# Patient Record
Sex: MALE | Race: WHITE | URBAN - METROPOLITAN AREA
[De-identification: names, ages, dates, MRNs, and addresses within clinical notes are randomized per-mention and may not be internally consistent; named-entity substitution may affect disease eponyms.]

---

## 2022-07-27 ENCOUNTER — OFFICE VISIT (OUTPATIENT)
Dept: URBAN - METROPOLITAN AREA CLINIC 111 | Facility: CLINIC | Age: 62
End: 2022-07-27
Payer: COMMERCIAL

## 2022-07-27 ENCOUNTER — DASHBOARD ENCOUNTERS (OUTPATIENT)
Age: 62
End: 2022-07-27

## 2022-07-27 ENCOUNTER — WEB ENCOUNTER (OUTPATIENT)
Dept: URBAN - METROPOLITAN AREA CLINIC 111 | Facility: CLINIC | Age: 62
End: 2022-07-27

## 2022-07-27 VITALS
HEIGHT: 72 IN | WEIGHT: 188.2 LBS | SYSTOLIC BLOOD PRESSURE: 133 MMHG | HEART RATE: 82 BPM | TEMPERATURE: 97.3 F | BODY MASS INDEX: 25.49 KG/M2 | DIASTOLIC BLOOD PRESSURE: 85 MMHG

## 2022-07-27 DIAGNOSIS — Z12.11 COLON CANCER SCREENING: ICD-10-CM

## 2022-07-27 DIAGNOSIS — K76.9 LIVER LESION, RIGHT LOBE: ICD-10-CM

## 2022-07-27 DIAGNOSIS — N20.0 NEPHROLITHIASIS: ICD-10-CM

## 2022-07-27 DIAGNOSIS — R10.9 ACUTE RIGHT FLANK PAIN: ICD-10-CM

## 2022-07-27 PROBLEM — 95570007: Status: ACTIVE | Noted: 2022-07-27

## 2022-07-27 PROBLEM — 235856003 LIVER DISEASE: Status: ACTIVE | Noted: 2022-07-27

## 2022-07-27 PROCEDURE — 99204 OFFICE O/P NEW MOD 45 MIN: CPT | Performed by: INTERNAL MEDICINE

## 2022-07-27 RX ORDER — LIDOCAINE 140 MG/1
1 PATCH REMOVE AFTER 12 HOURS PATCH CUTANEOUS ONCE A DAY
Status: ACTIVE | COMMUNITY

## 2022-07-27 RX ORDER — CYCLOBENZAPRINE HYDROCHLORIDE 10 MG/1
1 TABLET AT BEDTIME AS NEEDED TABLET, FILM COATED ORAL ONCE A DAY
Status: ACTIVE | COMMUNITY

## 2022-07-27 NOTE — HPI-TODAY'S VISIT:
- 62 yo  male, self-referred for further evaluation of GI complaints as detailed below - Patient was seen in the emergency room at [facility] 2 days ago with complaints of acute right-sided flank pain.  Had normal blood work.  Noncontrast abdominopelvic CT was done, which revealed nonobstructing left-sided nephrolithiasis.  The stones were tiny, measuring up to 4 mm in size with no hydronephrosis.  More importantly, the noncontrast CT suggested an ill-defined hypoattenuating right hepatic lobe lesion within segment 5, which measured 4.8 cm in size.  It was incompletely evaluated due to the noncontrast nature of the CT.   - Patient was discharged home on a lidocaine patch as well as as needed diclofenac and cyclobenzaprine.  Tells me he is feeling much better at this time.  He is obviously concerned about his possible liver lesion.  Denies history of chronic liver disease.  States he had acute viral hepatitis in 1996, with subsequent seroconversion.  Liver function tests were normal at [facility] 2 days ago. - Patient has never had a colonoscopy in the past - Denies hematochezia, change in bowel habits, or weight loss - Does not know his family medical history, as he was adopted  - Denies heartburn symptoms or history of chronic GERD - Medical records from [facility] were reviewed and discussed with patient

## 2022-07-28 ENCOUNTER — LAB OUTSIDE AN ENCOUNTER (OUTPATIENT)
Dept: URBAN - METROPOLITAN AREA CLINIC 23 | Facility: CLINIC | Age: 62
End: 2022-07-28

## 2022-07-28 LAB
CREATININE POC: 1.1
PERFORMING LAB: (no result)

## 2022-07-29 ENCOUNTER — WEB ENCOUNTER (OUTPATIENT)
Dept: URBAN - METROPOLITAN AREA CLINIC 23 | Facility: CLINIC | Age: 62
End: 2022-07-29

## 2022-08-01 ENCOUNTER — WEB ENCOUNTER (OUTPATIENT)
Dept: URBAN - METROPOLITAN AREA CLINIC 111 | Facility: CLINIC | Age: 62
End: 2022-08-01

## 2022-08-01 ENCOUNTER — TELEPHONE ENCOUNTER (OUTPATIENT)
Dept: URBAN - METROPOLITAN AREA CLINIC 23 | Facility: CLINIC | Age: 62
End: 2022-08-01

## 2022-08-01 RX ORDER — CYCLOBENZAPRINE HYDROCHLORIDE 10 MG/1: 1 TABLET AT BEDTIME AS NEEDED TABLET, FILM COATED ORAL ONCE A DAY

## 2022-08-18 ENCOUNTER — LAB OUTSIDE AN ENCOUNTER (OUTPATIENT)
Dept: URBAN - METROPOLITAN AREA CLINIC 23 | Facility: CLINIC | Age: 62
End: 2022-08-18

## 2022-08-18 LAB
ABSOLUTE BASOPHIL COUNT: 0.04
ABSOLUTE EOSINOPHIL COUNT: 0.04
ABSOLUTE IMMATURE GRANULOCYTE  COUNT: 0.11
ABSOLUTE LYMPHOCYTE COUNT: 0.99
ABSOLUTE MONOCYTE COUNT: 0.89
ABSOLUTE NEUTROPHIL COUNT (ANC): 6.32
ABSOLUTE NRBC  COUNT: 0
BASOPHIL AUTO: 0
EOS AUTO: 0
HCT: 43.3
HGB: 13.8
IMMATURE GRANULOCYTES AUTO: 1.3
INR: 1
LYMPH AUTO: 12
MCH: 27.9
MCHC: 31.9
MCV: 87.7
MONO AUTO: 11
MPV: 9.4
NEUTRO AUTO: 75
NRBC AUTO: 0
PARTIAL THROMBOPLASTIN TIME: 32.7
PERFORMING LAB: (no result)
PLATELETS: 263
PT: 13.3
RBC: 4.94
RDW: 11.9
WBC: 8.4

## 2022-08-31 ENCOUNTER — TELEPHONE ENCOUNTER (OUTPATIENT)
Dept: URBAN - METROPOLITAN AREA CLINIC 92 | Facility: CLINIC | Age: 62
End: 2022-08-31

## 2022-08-31 ENCOUNTER — LAB OUTSIDE AN ENCOUNTER (OUTPATIENT)
Dept: URBAN - METROPOLITAN AREA CLINIC 92 | Facility: CLINIC | Age: 62
End: 2022-08-31

## 2022-09-02 ENCOUNTER — OFFICE VISIT (OUTPATIENT)
Dept: URBAN - METROPOLITAN AREA SURGERY CENTER 15 | Facility: SURGERY CENTER | Age: 62
End: 2022-09-02
Payer: COMMERCIAL

## 2022-09-02 ENCOUNTER — TELEPHONE ENCOUNTER (OUTPATIENT)
Dept: URBAN - METROPOLITAN AREA CLINIC 92 | Facility: CLINIC | Age: 62
End: 2022-09-02

## 2022-09-02 DIAGNOSIS — C22.9 LIVER CANCER: ICD-10-CM

## 2022-09-02 PROBLEM — 307226002: Status: ACTIVE | Noted: 2022-08-31

## 2022-09-02 PROBLEM — 40719004 EROSIVE ESOPHAGITIS: Status: ACTIVE | Noted: 2022-09-02

## 2022-09-02 PROCEDURE — G8907 PT DOC NO EVENTS ON DISCHARG: HCPCS | Performed by: INTERNAL MEDICINE

## 2022-09-02 PROCEDURE — 45378 DIAGNOSTIC COLONOSCOPY: CPT | Performed by: INTERNAL MEDICINE

## 2022-09-02 PROCEDURE — 43235 EGD DIAGNOSTIC BRUSH WASH: CPT | Performed by: INTERNAL MEDICINE

## 2022-09-02 RX ORDER — CYCLOBENZAPRINE HYDROCHLORIDE 10 MG/1
1 TABLET AT BEDTIME AS NEEDED TABLET, FILM COATED ORAL ONCE A DAY
Status: ACTIVE | COMMUNITY

## 2022-09-02 RX ORDER — LIDOCAINE 140 MG/1
1 PATCH REMOVE AFTER 12 HOURS PATCH CUTANEOUS ONCE A DAY
Status: ACTIVE | COMMUNITY

## 2022-09-15 PROBLEM — 93870000 LIVER CANCER: Status: ACTIVE | Noted: 2022-09-15
